# Patient Record
Sex: FEMALE | Race: BLACK OR AFRICAN AMERICAN | URBAN - METROPOLITAN AREA
[De-identification: names, ages, dates, MRNs, and addresses within clinical notes are randomized per-mention and may not be internally consistent; named-entity substitution may affect disease eponyms.]

---
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
## 2023-02-23 ENCOUNTER — OFFICE VISIT (OUTPATIENT)
Dept: URBAN - METROPOLITAN AREA CLINIC 22 | Facility: CLINIC | Age: 23
End: 2023-02-23
Payer: COMMERCIAL

## 2023-02-23 DIAGNOSIS — H52.13 MYOPIA, BILATERAL: ICD-10-CM

## 2023-02-23 DIAGNOSIS — H00.025 HORDEOLUM INTERNUM LEFT LOWER EYELID: Primary | ICD-10-CM

## 2023-02-23 PROCEDURE — 92014 COMPRE OPH EXAM EST PT 1/>: CPT | Performed by: STUDENT IN AN ORGANIZED HEALTH CARE EDUCATION/TRAINING PROGRAM

## 2023-02-23 RX ORDER — NEOMYCIN SULFATE, POLYMYXIN B SULFATE AND DEXAMETHASONE 3.5; 10000; 1 MG/G; [USP'U]/G; MG/G
OINTMENT OPHTHALMIC
Qty: 3.5 | Refills: 1 | Status: ACTIVE
Start: 2023-02-23

## 2023-02-23 ASSESSMENT — INTRAOCULAR PRESSURE
OS: 13
OD: 13

## 2023-02-23 ASSESSMENT — VISUAL ACUITY
OD: 20/20
OS: 20/25

## 2023-02-23 NOTE — IMPRESSION/PLAN
Impression: Hordeolum internum left lower eyelid: H00.025. Plan: Discussed findings. Rx warm compresses x 10 min QID, Maxitrol aure BID x 14 days. Contact clinic for follow-up if symptoms persist/worsen.